# Patient Record
Sex: MALE | Race: WHITE | NOT HISPANIC OR LATINO | URBAN - METROPOLITAN AREA
[De-identification: names, ages, dates, MRNs, and addresses within clinical notes are randomized per-mention and may not be internally consistent; named-entity substitution may affect disease eponyms.]

---

## 2021-07-28 ENCOUNTER — EMERGENCY (EMERGENCY)
Facility: HOSPITAL | Age: 26
LOS: 0 days | Discharge: HOME | End: 2021-07-28
Attending: EMERGENCY MEDICINE | Admitting: EMERGENCY MEDICINE
Payer: OTHER MISCELLANEOUS

## 2021-07-28 VITALS
WEIGHT: 134.92 LBS | DIASTOLIC BLOOD PRESSURE: 68 MMHG | HEART RATE: 82 BPM | OXYGEN SATURATION: 99 % | SYSTOLIC BLOOD PRESSURE: 122 MMHG | RESPIRATION RATE: 18 BRPM | TEMPERATURE: 96 F

## 2021-07-28 DIAGNOSIS — M25.532 PAIN IN LEFT WRIST: ICD-10-CM

## 2021-07-28 DIAGNOSIS — R20.2 PARESTHESIA OF SKIN: ICD-10-CM

## 2021-07-28 DIAGNOSIS — R45.1 RESTLESSNESS AND AGITATION: ICD-10-CM
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
## 2021-07-28 DIAGNOSIS — S63.92XA SPRAIN OF UNSPECIFIED PART OF LEFT WRIST AND HAND, INITIAL ENCOUNTER: ICD-10-CM

## 2021-07-28 PROCEDURE — 73110 X-RAY EXAM OF WRIST: CPT | Mod: 26,LT

## 2021-07-28 PROCEDURE — 99283 EMERGENCY DEPT VISIT LOW MDM: CPT

## 2021-07-28 PROCEDURE — 73130 X-RAY EXAM OF HAND: CPT | Mod: 26,LT

## 2021-07-28 RX ORDER — IBUPROFEN 200 MG
600 TABLET ORAL ONCE
Refills: 0 | Status: COMPLETED | OUTPATIENT
Start: 2021-07-28 | End: 2021-07-28

## 2021-07-28 RX ADMIN — Medication 600 MILLIGRAM(S): at 21:20

## 2021-07-28 NOTE — ED PROVIDER NOTE - PHYSICAL EXAMINATION
GENERAL: Well-developed; well-nourished; in no acute distress.   SKIN: warm, dry  HEAD: Normocephalic; atraumatic.  EYES: PERRLA, EOMI  CARD: S1, S2 normal; no murmurs, gallops, or rubs. Regular rate and rhythm.   RESP: No wheezes, rales or rhonchi.  EXT: TTP at L radial styloid. Decrease L wrist ROM due to pain.   NEURO: Alert, oriented, grossly unremarkable. Strength 5/5 and sensation intact in bilateral UEs.   PSYCH: Cooperative, appropriate.

## 2021-07-28 NOTE — ED PROVIDER NOTE - PATIENT PORTAL LINK FT
You can access the FollowMyHealth Patient Portal offered by [facility] by registering at the following website: [email or web address]. By joining [employer]’s FollowMyHealth portal, you will also be able to view your health information using other applications (apps) compatible with our system.

## 2021-07-28 NOTE — ED PROVIDER NOTE - NS ED ROS FT
Constitutional: No fevers, chills, or malaise.  MS: Reports wrist pain  Neuro:  No paralysis or N/T.   Skin:  No skin changes
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
## 2021-07-28 NOTE — ED PROVIDER NOTE - NSFOLLOWUPINSTRUCTIONS_ED_ALL_ED_FT
Ankle Sprain       An ankle sprain is a stretch or tear in a ligament in the ankle. Ligaments are tissues that connect bones to each other.  The two most common types of ankle sprains are:  •Inversion sprain. This happens when the foot turns inward and the ankle rolls outward. It affects the ligament on the outside of the foot (lateral ligament).      •Eversion sprain. This happens when the foot turns outward and the ankle rolls inward. It affects the ligament on the inner side of the foot (medial ligament).        What are the causes?    This condition is often caused by accidentally rolling or twisting the ankle.      What increases the risk?    You are more likely to develop this condition if you play sports.      What are the signs or symptoms?  Symptoms of this condition include:  •Pain in your ankle.      •Swelling.      •Bruising. This may develop right after you sprain your ankle or 1–2 days later.      •Trouble standing or walking, especially when you turn or change directions.        How is this diagnosed?  This condition is diagnosed with:  •A physical exam. During the exam, your health care provider will press on certain parts of your foot and ankle and try to move them in certain ways.      •X-ray imaging. These may be taken to see how severe the sprain is and to check for broken bones.        How is this treated?  This condition may be treated with:  •A brace or splint. This is used to keep the ankle from moving until it heals.      •An elastic bandage. This is used to support the ankle.      •Crutches.      •Pain medicine.      •Surgery. This may be needed if the sprain is severe.      •Physical therapy. This may help to improve the range of motion in the ankle.        Follow these instructions at home:    If you have a brace or a splint:     •Wear the brace or splint as told by your health care provider. Remove it only as told by your health care provider.      •Loosen the brace or splint if your toes tingle, become numb, or turn cold and blue.      •Keep the brace or splint clean.    •If the brace or splint is not waterproof:  •Do not let it get wet.       •Cover it with a watertight covering when you take a bath or a shower.        If you have an elastic bandage (dressing):     •Remove it to shower or bathe.      •Try not to move your ankle much, but wiggle your toes from time to time. This helps to prevent swelling.      •Adjust the dressing to make it more comfortable if it feels too tight.      •Loosen the dressing if you have numbness or tingling in your foot, or if your foot becomes cold and blue.        Managing pain, stiffness, and swelling      •Take over-the-counter and prescription medicines only as told by your health care provider.      •For 2–3 days, keep your ankle raised (elevated) above the level of your heart as much as possible.    •If directed, put ice on the injured area:  •If you have a removable brace or splint, remove it as told by your health care provider.      •Put ice in a plastic bag.      •Place a towel between your skin and the bag.      •Leave the ice on for 20 minutes, 2–3 times a day.        General instructions     •Rest your ankle.      • Do not use the injured limb to support your body weight until your health care provider says that you can. Use crutches as told by your health care provider.      • Do not use any products that contain nicotine or tobacco, such as cigarettes, e-cigarettes, and chewing tobacco. If you need help quitting, ask your health care provider.      •Keep all follow-up visits as told by your health care provider. This is important.        Contact a health care provider if:    •You have rapidly increasing bruising or swelling.      •Your pain is not relieved with medicine.        Get help right away if:    •Your foot or toes become numb or blue.      •You have severe pain that gets worse.        Summary    •An ankle sprain is a stretch or tear in a ligament in the ankle. Ligaments are tissues that connect bones to each other.      •This condition is often caused by accidentally rolling or twisting the ankle.      •Symptoms include pain, swelling, bruising, and trouble walking.      •To relieve pain and swelling, put ice on the affected ankle, raise your ankle above the level of your heart, and use an elastic bandage.      •Keep all follow-up visits as told by your health care provider. This is important.      This information is not intended to replace advice given to you by your health care provider. Make sure you discuss any questions you have with your health care provider Wrist Pain, Adult    There are many things that can cause wrist pain. Some common causes include:    An injury to the wrist area, such as a sprain, strain, or fracture.  Overuse of the joint.  A condition that causes increased pressure on a nerve in the wrist (carpal tunnel syndrome).  Wear and tear of the joints that occurs with aging (osteoarthritis).  A variety of other types of arthritis.    Sometimes, the cause of wrist pain is not known. Often, the pain goes away when you follow instructions from your health care provider for relieving pain at home, such as resting or icing the wrist. If your wrist pain continues, it is important to tell your health care provider.    Follow these instructions at home:  Rest the wrist area for at least 48 hours or as long as told by your health care provider.  If a splint or elastic bandage has been applied, use it as told by your health care provider.    Remove the splint or bandage only as told by your health care provider.  Loosen the splint or bandage if your fingers tingle, become numb, or turn cold or blue.    ImageIf directed, apply ice to the injured area.    If you have a removable splint or elastic bandage, remove it as told by your health care provider.  Put ice in a plastic bag.  Place a towel between your skin and the bag or between your splint or bandage and the bag.  Leave the ice on for 20 minutes, 2–3 times a day.    Keep your arm raised (elevated) above the level of your heart while you are sitting or lying down.  Take over-the-counter and prescription medicines only as told by your health care provider.  Keep all follow-up visits as told by your health care provider. This is important.  Contact a health care provider if:  You have a sudden sharp pain in the wrist, hand, or arm that is different or new.  The swelling or bruising on your wrist or hand gets worse.  Your skin becomes red, gets a rash, or has open sores.  Your pain does not get better or it gets worse.  Get help right away if:  You lose feeling in your fingers or hand.  Your fingers turn white, very red, or cold and blue.  You cannot move your fingers.  You have a fever or chills.  This information is not intended to replace advice given to you by your health care provider. Make sure you discuss any questions you have with your health care provider.

## 2021-11-10 ENCOUNTER — EMERGENCY (EMERGENCY)
Facility: HOSPITAL | Age: 26
LOS: 0 days | Discharge: HOME | End: 2021-11-10
Attending: EMERGENCY MEDICINE | Admitting: EMERGENCY MEDICINE
Payer: OTHER MISCELLANEOUS

## 2021-11-10 VITALS
RESPIRATION RATE: 18 BRPM | TEMPERATURE: 98 F | HEART RATE: 90 BPM | DIASTOLIC BLOOD PRESSURE: 73 MMHG | OXYGEN SATURATION: 100 % | SYSTOLIC BLOOD PRESSURE: 130 MMHG

## 2021-11-10 DIAGNOSIS — M54.2 CERVICALGIA: ICD-10-CM
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
## 2021-11-10 DIAGNOSIS — S13.9XXA SPRAIN OF JOINTS AND LIGAMENTS OF UNSPECIFIED PARTS OF NECK, INITIAL ENCOUNTER: ICD-10-CM

## 2021-11-10 PROCEDURE — 99283 EMERGENCY DEPT VISIT LOW MDM: CPT

## 2021-11-10 RX ORDER — METHOCARBAMOL 500 MG/1
1000 TABLET, FILM COATED ORAL ONCE
Refills: 0 | Status: COMPLETED | OUTPATIENT
Start: 2021-11-10 | End: 2021-11-10

## 2021-11-10 RX ORDER — IBUPROFEN 200 MG
800 TABLET ORAL ONCE
Refills: 0 | Status: COMPLETED | OUTPATIENT
Start: 2021-11-10 | End: 2021-11-10

## 2021-11-10 RX ADMIN — Medication 800 MILLIGRAM(S): at 21:35

## 2021-11-10 RX ADMIN — METHOCARBAMOL 1000 MILLIGRAM(S): 500 TABLET, FILM COATED ORAL at 21:34

## 2021-11-10 NOTE — ED PROVIDER NOTE - PHYSICAL EXAMINATION
VITAL SIGNS: I have reviewed nursing notes and confirm.  CONSTITUTIONAL: non-toxic, well appearing, + airway intact, GCS 15  SKIN: no rash, no petechiae. no ecchymosis, no lacerations  EYES: PERRL, EOMI,  ENT: tongue midline,  NECK: Supple; no cervical-thoracic-lumbar spine tenderness, + R paraspinal tend.   CARD: RRR, equal radial pulses bilaterally 2+  RESP: CTAB, no respiratory distress, no crepitus over chest wall  ABD: Soft, non-tender, non-distended  PELVIS: stable  EXT: Normal ROM x4. no bony tenderness, equal strength bilaterally  NEURO: Alert, oriented. CN2-12 intact, equal strength bilaterally, nl gait.  PSYCH: Cooperative, appropriate.
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
## 2021-11-10 NOTE — ED PROVIDER NOTE - PATIENT PORTAL LINK FT
You can access the FollowMyHealth Patient Portal offered by [facility] by registering at the following website: [email or web address]. By joining [employer]’s FollowMyHealth portal, you will also be able to view your health information using other applications (apps) compatible with our system.

## 2022-10-15 ENCOUNTER — EMERGENCY (EMERGENCY)
Facility: HOSPITAL | Age: 27
LOS: 0 days | Discharge: HOME | End: 2022-10-16
Attending: STUDENT IN AN ORGANIZED HEALTH CARE EDUCATION/TRAINING PROGRAM | Admitting: STUDENT IN AN ORGANIZED HEALTH CARE EDUCATION/TRAINING PROGRAM

## 2022-10-15 VITALS
HEART RATE: 102 BPM | RESPIRATION RATE: 18 BRPM | OXYGEN SATURATION: 98 % | WEIGHT: 130.07 LBS | DIASTOLIC BLOOD PRESSURE: 70 MMHG | SYSTOLIC BLOOD PRESSURE: 132 MMHG | TEMPERATURE: 98 F

## 2022-10-15 DIAGNOSIS — Z11.4 ENCOUNTER FOR SCREENING FOR HUMAN IMMUNODEFICIENCY VIRUS [HIV]: ICD-10-CM
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
## 2022-10-15 LAB
ALBUMIN SERPL ELPH-MCNC: 4.9 G/DL — SIGNIFICANT CHANGE UP (ref 3.5–5.2)
ALP SERPL-CCNC: 65 U/L — SIGNIFICANT CHANGE UP (ref 30–115)
ALT FLD-CCNC: 8 U/L — SIGNIFICANT CHANGE UP (ref 0–41)
ANION GAP SERPL CALC-SCNC: 12 MMOL/L — SIGNIFICANT CHANGE UP (ref 7–14)
AST SERPL-CCNC: 13 U/L — SIGNIFICANT CHANGE UP (ref 0–41)
BASOPHILS # BLD AUTO: 0.06 K/UL — SIGNIFICANT CHANGE UP (ref 0–0.2)
BASOPHILS NFR BLD AUTO: 0.8 % — SIGNIFICANT CHANGE UP (ref 0–1)
BILIRUB SERPL-MCNC: 0.4 MG/DL — SIGNIFICANT CHANGE UP (ref 0.2–1.2)
BUN SERPL-MCNC: 14 MG/DL — SIGNIFICANT CHANGE UP (ref 10–20)
CALCIUM SERPL-MCNC: 9.4 MG/DL — SIGNIFICANT CHANGE UP (ref 8.4–10.5)
CHLORIDE SERPL-SCNC: 107 MMOL/L — SIGNIFICANT CHANGE UP (ref 98–110)
CO2 SERPL-SCNC: 23 MMOL/L — SIGNIFICANT CHANGE UP (ref 17–32)
CREAT SERPL-MCNC: 0.9 MG/DL — SIGNIFICANT CHANGE UP (ref 0.7–1.5)
EGFR: 122 ML/MIN/1.73M2 — SIGNIFICANT CHANGE UP
EOSINOPHIL # BLD AUTO: 0.15 K/UL — SIGNIFICANT CHANGE UP (ref 0–0.7)
EOSINOPHIL NFR BLD AUTO: 2 % — SIGNIFICANT CHANGE UP (ref 0–8)
GLUCOSE SERPL-MCNC: 84 MG/DL — SIGNIFICANT CHANGE UP (ref 70–99)
HCT VFR BLD CALC: 42.9 % — SIGNIFICANT CHANGE UP (ref 42–52)
HGB BLD-MCNC: 14.4 G/DL — SIGNIFICANT CHANGE UP (ref 14–18)
IMM GRANULOCYTES NFR BLD AUTO: 0.4 % — HIGH (ref 0.1–0.3)
LYMPHOCYTES # BLD AUTO: 2.65 K/UL — SIGNIFICANT CHANGE UP (ref 1.2–3.4)
LYMPHOCYTES # BLD AUTO: 36 % — SIGNIFICANT CHANGE UP (ref 20.5–51.1)
MCHC RBC-ENTMCNC: 28.5 PG — SIGNIFICANT CHANGE UP (ref 27–31)
MCHC RBC-ENTMCNC: 33.6 G/DL — SIGNIFICANT CHANGE UP (ref 32–37)
MCV RBC AUTO: 84.8 FL — SIGNIFICANT CHANGE UP (ref 80–94)
MONOCYTES # BLD AUTO: 0.72 K/UL — HIGH (ref 0.1–0.6)
MONOCYTES NFR BLD AUTO: 9.8 % — HIGH (ref 1.7–9.3)
NEUTROPHILS # BLD AUTO: 3.75 K/UL — SIGNIFICANT CHANGE UP (ref 1.4–6.5)
NEUTROPHILS NFR BLD AUTO: 51 % — SIGNIFICANT CHANGE UP (ref 42.2–75.2)
NRBC # BLD: 0 /100 WBCS — SIGNIFICANT CHANGE UP (ref 0–0)
PLATELET # BLD AUTO: 292 K/UL — SIGNIFICANT CHANGE UP (ref 130–400)
POTASSIUM SERPL-MCNC: 4.5 MMOL/L — SIGNIFICANT CHANGE UP (ref 3.5–5)
POTASSIUM SERPL-SCNC: 4.5 MMOL/L — SIGNIFICANT CHANGE UP (ref 3.5–5)
PROT SERPL-MCNC: 7.2 G/DL — SIGNIFICANT CHANGE UP (ref 6–8)
RBC # BLD: 5.06 M/UL — SIGNIFICANT CHANGE UP (ref 4.7–6.1)
RBC # FLD: 13.7 % — SIGNIFICANT CHANGE UP (ref 11.5–14.5)
SODIUM SERPL-SCNC: 142 MMOL/L — SIGNIFICANT CHANGE UP (ref 135–146)
WBC # BLD: 7.36 K/UL — SIGNIFICANT CHANGE UP (ref 4.8–10.8)
WBC # FLD AUTO: 7.36 K/UL — SIGNIFICANT CHANGE UP (ref 4.8–10.8)

## 2022-10-15 PROCEDURE — 99284 EMERGENCY DEPT VISIT MOD MDM: CPT
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
## 2022-10-16 PROBLEM — Z78.9 OTHER SPECIFIED HEALTH STATUS: Chronic | Status: ACTIVE | Noted: 2021-07-28

## 2022-10-16 RX ORDER — EMTRICITABINE AND TENOFOVIR DISOPROXIL FUMARATE 200; 300 MG/1; MG/1
1 TABLET, FILM COATED ORAL DAILY
Refills: 0 | Status: DISCONTINUED | OUTPATIENT
Start: 2022-10-16 | End: 2022-10-16

## 2022-10-16 RX ORDER — RALTEGRAVIR 400 MG/1
400 TABLET, FILM COATED ORAL
Refills: 0 | Status: DISCONTINUED | OUTPATIENT
Start: 2022-10-16 | End: 2022-10-16

## 2022-10-16 NOTE — ED PROVIDER NOTE - PHYSICAL EXAMINATION
CONSTITUTIONAL: Well-developed; well-nourished; in no acute distress, nontoxic appearing  SKIN: skin exam is warm and dry  EYES: PERRL, 3 mm bilateral, no nystagmus, EOM intact; conjunctiva and sclera clear.  ENT: MMM, oropharynx non-erythematous, uvula midline, tolerating secretions   NECK: ROM intact.  CARD: S1, S2 normal, no murmur  RESP: Good air movement bilaterally  ABD: soft; non-distended; non-tender.    EXT: Normal ROM.    NEURO: awake, alert, following commands, oriented, grossly unremarkable. No Focal deficits. GCS 15.   PSYCH: Cooperative, appropriate.
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
## 2022-10-16 NOTE — ED PROVIDER NOTE - NS ED ROS FT
Review of Systems:  	•	CONSTITUTIONAL: no fever   	•	SKIN: no rash   	•	EYES: no eye pain, no blurry vision  	•	ENT: no nasal congestion, no sore throat   	•	RESPIRATORY: no shortness of breath   	•	CARDIAC: no chest pain   	•	GI: no nausea, no vomiting   	•	MUSCULOSKELETAL: no joint paint, no swelling, no redness  	•	NEUROLOGIC: no weakness, no headache

## 2022-10-16 NOTE — ED PROVIDER NOTE - PATIENT PORTAL LINK FT
You can access the FollowMyHealth Patient Portal offered by [facility] by registering at the following website: [email or web address]. By joining [employer]’s FollowMyHealth portal, you will also be able to view your health information using other applications (apps) compatible with our system.

## 2022-10-16 NOTE — ED PROVIDER NOTE - NSFOLLOWUPCLINICS_GEN_ALL_ED_FT
[facility]  Medicine  [address], [place]   Phone: [phone]  Fax:   Follow Up Time: 1-3 Days
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
## 2022-10-17 LAB
HAV IGM SER-ACNC: SIGNIFICANT CHANGE UP
HBV CORE IGM SER-ACNC: SIGNIFICANT CHANGE UP
HBV SURFACE AG SER-ACNC: SIGNIFICANT CHANGE UP
HCV AB S/CO SERPL IA: 0.12 S/CO — SIGNIFICANT CHANGE UP (ref 0–0.99)
HCV AB SERPL-IMP: SIGNIFICANT CHANGE UP
HIV 1+2 AB+HIV1 P24 AG SERPL QL IA: SIGNIFICANT CHANGE UP
T PALLIDUM AB TITR SER: NEGATIVE — SIGNIFICANT CHANGE UP

## 2024-09-08 ENCOUNTER — EMERGENCY (EMERGENCY)
Facility: HOSPITAL | Age: 29
LOS: 0 days | Discharge: HOME | End: 2024-09-08
Attending: STUDENT IN AN ORGANIZED HEALTH CARE EDUCATION/TRAINING PROGRAM
Payer: COMMERCIAL

## 2024-09-08 VITALS
OXYGEN SATURATION: 99 % | RESPIRATION RATE: 16 BRPM | WEIGHT: 130.07 LBS | SYSTOLIC BLOOD PRESSURE: 138 MMHG | HEART RATE: 98 BPM | TEMPERATURE: 98 F | DIASTOLIC BLOOD PRESSURE: 80 MMHG
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
## 2024-09-08 DIAGNOSIS — S60.222A CONTUSION OF LEFT HAND, INITIAL ENCOUNTER: ICD-10-CM
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
## 2024-09-08 PROCEDURE — 73140 X-RAY EXAM OF FINGER(S): CPT | Mod: LT

## 2024-09-08 PROCEDURE — 99284 EMERGENCY DEPT VISIT MOD MDM: CPT

## 2024-09-08 PROCEDURE — 73140 X-RAY EXAM OF FINGER(S): CPT | Mod: 26,LT

## 2024-09-08 PROCEDURE — 99283 EMERGENCY DEPT VISIT LOW MDM: CPT | Mod: 25

## 2024-09-08 NOTE — ED PROVIDER NOTE - PHYSICAL EXAMINATION
CONSTITUTIONAL: Well-appearing; well-nourished; in no apparent distress.   HEAD: Normocephalic; atraumatic.   EYES: Conjunctiva normal B/L.   RESPIRATORY: Normal chest excursion with respiration;  GI/: non-distended  EXT: FDP and FDS intact to the digit, normal flexion extension against resistance, abduction and adduction intact against resistance with full range of motion in all directions.  No abrasion to the skin, no ecchymosis, no edema.  Mild bony tenderness on the proximal phalanx.  No sensory deficits.  Normal pulses, normal cap refill.  SKIN: Normal for age and race; warm; dry
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
## 2024-09-08 NOTE — ED PROVIDER NOTE - PATIENT PORTAL LINK FT
You can access the FollowMyHealth Patient Portal offered by [facility] by registering at the following website: [email or web address]. By joining [employer]’s FollowMyHealth portal, you will also be able to view your health information using other applications (apps) compatible with our system.
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
## 2024-09-09 PROBLEM — Z78.9 OTHER SPECIFIED HEALTH STATUS: Chronic | Status: ACTIVE | Noted: 2021-11-10

## 2025-02-19 ENCOUNTER — EMERGENCY (EMERGENCY)
Facility: HOSPITAL | Age: 30
LOS: 0 days | Discharge: HOME | End: 2025-02-19
Attending: EMERGENCY MEDICINE
Payer: COMMERCIAL

## 2025-02-19 VITALS
DIASTOLIC BLOOD PRESSURE: 80 MMHG | RESPIRATION RATE: 18 BRPM | HEIGHT: 66 IN | WEIGHT: 130.07 LBS | SYSTOLIC BLOOD PRESSURE: 125 MMHG | OXYGEN SATURATION: 100 % | TEMPERATURE: 98 F | HEART RATE: 102 BPM
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
## 2025-02-19 DIAGNOSIS — M25.562 PAIN IN LEFT KNEE: ICD-10-CM
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
## 2025-02-19 PROCEDURE — 73562 X-RAY EXAM OF KNEE 3: CPT | Mod: LT

## 2025-02-19 PROCEDURE — 99284 EMERGENCY DEPT VISIT MOD MDM: CPT

## 2025-02-19 PROCEDURE — 73562 X-RAY EXAM OF KNEE 3: CPT | Mod: 26,LT

## 2025-02-19 PROCEDURE — 99283 EMERGENCY DEPT VISIT LOW MDM: CPT | Mod: 25

## 2025-02-19 NOTE — ED PROVIDER NOTE - PHYSICAL EXAMINATION
VITAL SIGNS: I have reviewed nursing notes and confirm.  CONSTITUTIONAL: Patient is in no acute distress.  SKIN: Skin exam is warm and dry, no acute rash.  HEAD: Normocephalic; atraumatic.  EYES: PERRL, EOM intact; conjunctiva and sclera clear.  ENT: No nasal discharge; airway clear.   NECK: Supple; non tender.  CARD: S1, S2 normal; no murmurs, gallops, or rubs. Regular rate and rhythm.  RESP: Clear to auscultation bilaterally. No wheezes, rales or rhonchi.  ABD: Normal bowel sounds; soft; non-distended; non-tender.   EXT: Normal ROM. No edema. Mild tenderness to left knee.  LYMPH: No acute cervical adenopathy.  NEURO: Alert, oriented. Grossly unremarkable. No focal deficits.  PSYCH: Cooperative, appropriate.

## 2025-02-19 NOTE — ED PROVIDER NOTE - CLINICAL SUMMARY MEDICAL DECISION MAKING FREE TEXT BOX
29-year-old male with knee discomfort after a fall.  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 Injury occurred about 4 hours ago, he is able to ambulate, no other complaints.  Well appearing male in no distress, no obvious limb deformity, no tenderness to palpation along the joint line, no calf tenderness to palpation, normal gait.  Patient states he does not really have pain just feels "tightness" in his knee.  X-ray film was independently interpreted by me ED attending is negative for acute osseous pathology.  Patient was advised to follow-up with orthopedics if symptoms persist, take OTC pain medications, strict return precautions given.  Patient verbalized understanding is amenable to the plan.

## 2025-02-19 NOTE — ED PROVIDER NOTE - PATIENT PORTAL LINK FT
You can access the FollowMyHealth Patient Portal offered by [facility] by registering at the following website: [email or web address]. By joining [employer]’s FollowMyHealth portal, you will also be able to view your health information using other applications (apps) compatible with our system.

## 2025-02-19 NOTE — ED PROVIDER NOTE - NS ED ATTENDING STATEMENT MOD
This was a shared visit with the SAM. I reviewed and verified the documentation. [Anxiety] : anxiety [Negative] : Heme/Lymph

## 2025-02-19 NOTE — ED PROVIDER NOTE - OBJECTIVE STATEMENT
29-year-old male with no past medical history [employer] presenting for evaluation after he injured himself while at work.  Patient states that he fell onto his left knee.  No paresthesias or weakness.

## 2025-02-19 NOTE — ED PROVIDER NOTE - NSFOLLOWUPINSTRUCTIONS_ED_ALL_ED_FT
Our Emergency Department Referral Coordinators will be reaching out to you in the next 24-48 hours from 9:00am to 5:00pm to schedule a follow up appointment. Please expect a phone call from the hospital in that time frame. If you do not receive a call or if you have any questions or concerns, you can reach them at   [phone].     Acute Knee Pain, Adult  Many things can cause knee pain. Sometimes, knee pain is sudden (acute). It may be caused by damage, swelling, or irritation of the muscles and tissues that support your knee.    Pain may come from:  A fall.  An injury to the knee from twisting motions.  A hit to the knee.  Infection.  The pain often goes away on its own with time and rest. If the pain does not go away, tests may be done to find out what is causing the pain. These may include:  Imaging tests, such as an X-ray, MRI, CT scan, or ultrasound.  Joint aspiration. In this test, fluid is removed from the knee and checked.  Arthroscopy. In this test, a lighted tube is put in the knee and an image is shown on a screen.  A biopsy. In this test, a health care provider will remove a small piece of tissue for testing.  Follow these instructions at home:  If you have a knee sleeve or brace that can be taken off:    A person's knee with a brace on it.  Wear the knee sleeve or brace as told by your provider. Take it off only if your provider says that you can.  Check the skin around it every day. Tell your provider if you see problems.  Loosen the knee sleeve or brace if your toes tingle, are numb, or turn cold and blue.  Keep the knee sleeve or brace clean and dry.  Bathing    If the knee sleeve or brace is not waterproof:  Do not let it get wet.  Cover it when you take a bath or shower. Use a cover that does not let any water in.  Managing pain, stiffness, and swelling    Bag of ice on a towel on the skin.  If told, put ice on the area.  If you have a knee sleeve or brace that you can take off, remove it as told.  Put ice in a plastic bag.  Place a towel between your skin and the bag.  Leave the ice on for 20 minutes, 2–3 times a day.  If your skin turns bright red, take off the ice right away to prevent skin damage. The risk of damage is higher if you cannot feel pain, heat, or cold.  Move your toes often to reduce stiffness and swelling.  Raise the injured area above the level of your heart while you are sitting or lying down. Use a pillow to support your foot as needed.  If told, use an elastic bandage to put pressure (compression) on your injured knee. This may control swelling, give support, and help with discomfort.  Sleep with a pillow under your knee.  Activity    Rest your knee.  Do not do things that cause pain or make pain worse.  Do not stand or walk on your injured knee until you're told it's okay. Use crutches as told.  Avoid activities where both feet leave the ground at the same time and put stress on the joints. Avoid running, jumping rope, and doing jumping jacks.  Work with a physical therapist to make a safe exercise program if told. Physical therapy helps your knee move better and get stronger. Exercise as told.  General instructions    Take your medicines only as told by your provider.  If you are overweight, work with your provider and an expert in healthy eating, called a dietician, to set goals to lose weight. Being overweight can make your knee hurt more.  Do not smoke, vape, or use products with nicotine or tobacco in them. If you need help quitting, talk with your provider.  Return to normal activities when you are told. Ask what things are safe for you to do.  Watch for any changes in your symptoms.  Keep all follow-up visits. Your provider will check your healing and adjust treatments if needed.  Contact a health care provider if:  The knee pain does not stop.  The knee pain changes or gets worse.  You have a fever along with knee pain.  Your knee is red or feels warm when you touch it.  Your knee gives out or locks up.  Get help right away if:  Your knee swells and the swelling gets worse.  You cannot move your knee.  You have very bad knee pain that does not get better with medicine.  This information is not intended to replace advice given to you by your health care provider. Make sure you discuss any questions you have with your health care provider.

## 2025-02-19 NOTE — ED PROVIDER NOTE - ATTENDING APP SHARED VISIT CONTRIBUTION OF CARE
29-year-old male with knee discomfort after a fall.  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 Injury occurred about 4 hours ago, he is able to ambulate, no other complaints.  Well appearing male in no distress, no obvious limb deformity, no tenderness to palpation along the joint line, no calf tenderness to palpation, normal gait.  Patient states he does not really have pain just feels "tightness" in his knee.  X-ray film was independently interpreted by me ED attending is negative for acute osseous pathology.  Patient was advised to follow-up with orthopedics if symptoms persist, take OTC pain medications, strict return precautions given.  Patient verbalized understanding is amenable to the plan.